# Patient Record
Sex: FEMALE | Race: WHITE | Employment: UNEMPLOYED | URBAN - METROPOLITAN AREA
[De-identification: names, ages, dates, MRNs, and addresses within clinical notes are randomized per-mention and may not be internally consistent; named-entity substitution may affect disease eponyms.]

---

## 2019-11-26 ENCOUNTER — TELEPHONE (OUTPATIENT)
Dept: SURGERY | Age: 54
End: 2019-11-26

## 2019-11-26 NOTE — TELEPHONE ENCOUNTER
Returned patient's call. She had implants placed around 20 years ago. Had breast cancer 10 years ago, and Dr. Steph Prakash did her lumpectomy. Due to multiple call backs in the past few years, she is interested in discussing prophylactic mastectomies and replacement of her implants. I told her that I recommended that she get an appointment with Dr. Steph Prakash to discuss the above. Her plastic surgeon was Dr. Madison Kidd so we will have to find another plastic surgeon for her as well, and she knows this. I transferred her call to a PSR to schedule the appointment. She was appreciative.

## 2019-12-13 ENCOUNTER — OFFICE VISIT (OUTPATIENT)
Dept: SURGERY | Age: 54
End: 2019-12-13

## 2019-12-13 VITALS
HEART RATE: 84 BPM | WEIGHT: 108 LBS | HEIGHT: 63 IN | BODY MASS INDEX: 19.14 KG/M2 | SYSTOLIC BLOOD PRESSURE: 117 MMHG | DIASTOLIC BLOOD PRESSURE: 69 MMHG

## 2019-12-13 DIAGNOSIS — Z98.82 PRESENCE OF BILATERAL BREAST IMPLANT: ICD-10-CM

## 2019-12-13 DIAGNOSIS — Z85.3 HISTORY OF RIGHT BREAST CANCER: Primary | ICD-10-CM

## 2019-12-13 RX ORDER — MINERAL OIL
ENEMA (ML) RECTAL DAILY
COMMUNITY

## 2019-12-13 RX ORDER — TRAZODONE HYDROCHLORIDE 50 MG/1
TABLET ORAL
COMMUNITY

## 2019-12-13 NOTE — PROGRESS NOTES
HISTORY OF PRESENT ILLNESS  Ubaldo Francis is a 62 y.o. female. HPI  NEW patient consult self-referred, previous patient of Dr. Ree Pang, here for consult to discuss surgery.  She had implants placed around 19 years ago. [facility] breast cancer 10 years ago, and Dr. Ree Pang did her lumpectomy. She had XRT and Tamoxifen for 5 years.  Due to multiple call backs for imaging in the past few years, she is interested in discussing prophylactic mastectomies and replacement of her implants.        02/10/09: RIGHT breast bx. PATH: IDC, 3.5mm, [place] combined histologic grade 1, ER+(>90%)/GA+(>90%), HER2- by FISH. Treated with lumpectomy, XRT, and Tamoxifen x5 years.     Family History:  No family history of breast or ovarian cancer.      Imaging at [facility]:  Patient brought disc, no reports. Will have to call [facility] next week for reports of recent mammogram and MRI.       Past Medical History:   Diagnosis Date    Cancer [facility]) 2009    RIGHT breast cancer    Lichen planus        Past Surgical History:   Procedure Laterality Date    BREAST SURGERY PROCEDURE UNLISTED  2009    lumpectomy       Social History     Socioeconomic History    Marital status:      Spouse name: Not on file    Number of children: Not on file    Years of education: Not on file    Highest education level: Not on file   Occupational History    Not on file   Social Needs    Financial resource strain: Not on file    Food insecurity:     Worry: Not on file     Inability: Not on file    Transportation needs:     Medical: Not on file     Non-medical: Not on file   Tobacco Use    Smoking status: Never Smoker    Smokeless tobacco: Never Used   Substance and Sexual Activity    Alcohol use: Yes     Comment: couple glasses of wine/mo    Drug use: Not on file    Sexual activity: Not on file   Lifestyle    Physical activity:     Days per week: Not on file     Minutes per session: Not on file    Stress: Not on file   Relationships    Social connections:     Talks on phone: Not on file     Gets together: Not on file     Attends Jain service: Not on file     Active member of club or organization: Not on file     Attends meetings of clubs or organizations: Not on file     Relationship status: Not on file    Intimate partner violence:     Fear of current or ex partner: Not on file     Emotionally abused: Not on file     Physically abused: Not on file     Forced sexual activity: Not on file   Other Topics Concern    Not on file   Social History Narrative    Not on file       Current Outpatient Medications on File Prior to Visit   Medication Sig Dispense Refill    fexofenadine (ALLEGRA) 180 mg tablet Take  by mouth daily.  traZODone (DESYREL) 50 mg tablet Take  by mouth nightly. No current facility-administered medications on file prior to visit. Allergies   Allergen Reactions    Pcn [Penicillins] Rash and Swelling       OB History    No obstetric history on file. Obstetric Comments   Menarche 13, LMP age 46, # of children 1, age of 4st delivery 27, Hysterectomy/oophorectomy no/no, Breast bx yes, BILAT 2009, history of breast feeding no, BCP yes, Hormone therapy no             Review of Systems   Constitutional: Negative. HENT: Negative. Eyes: Negative. Respiratory: Negative. Cardiovascular: Negative. Gastrointestinal: Negative. Genitourinary: Negative. Musculoskeletal: Negative. Skin: Negative. Neurological: Negative. Endo/Heme/Allergies: Negative. Psychiatric/Behavioral: Negative. Physical Exam  Exam conducted with a chaperone present. Cardiovascular:      Rate and Rhythm: Normal rate and regular rhythm. Heart sounds: Normal heart sounds. Pulmonary:      Breath sounds: Normal breath sounds. Chest:      Breasts: Breasts are symmetrical.         Right: Normal. No swelling, bleeding, inverted nipple, mass, nipple discharge, skin change or tenderness.          Left: Normal. No swelling, bleeding, inverted nipple, mass, nipple discharge, skin change or tenderness. Lymphadenopathy:      Cervical:      Right cervical: No superficial, deep or posterior cervical adenopathy. Left cervical: No superficial, deep or posterior cervical adenopathy. Upper Body:      Right upper body: No supraclavicular or axillary adenopathy. Left upper body: No supraclavicular or axillary adenopathy. ASSESSMENT and PLAN    ICD-10-CM ICD-9-CM    1. History of right breast cancer Z85.3 V10.3    2. Presence of bilateral breast implant Z98.82 V43.82       New patient with hx of RIGHT breast cancer presents to discuss surgery, and is doing well overall. Normal exam, with normal rippling at underside of saline implants bilaterally. No evidence of recurrence of RIGHT breast cancer. A total of  80 minutes were spent face-to-face with the patient during this encounter and over half of that time was spent on counseling and coordination of care. Discussed pt's motivation to have BL prophylactic mastectomy. She states that because she will eventually need implants removed and/or replaced, she is interested in mastectomy to avoid future surgical biopsies and callbacks based on mammograms. Discussed approx. 22% risk of a second breast cancer, and risk reduction with BL mastectomy. Discussed skin and nipple sparing mastectomy (with incision at inframammary fold), with surgical delay and nipple bx, followed 2-3 weeks later by mastectomy and probable direct implant replacement by plastic surgeon. Discussed increased risk for surgical complication given history of XRT. Discussed treatment options in case of complications with nipple delay, including nitro paste, hyperbaric oxygen treatment, or delay of the mastectomy surgery. Discussed possible placement of new implants above the muscle, TBD by plastic surgeon. Drains would be placed after mastectomy, but not after nipple delay.     Reviewed that an implant replacement with plastic surgeon, and without mastectomy, would be a much simpler outpatient procedure, with shorter recovery time and less risk of complications. Implants feel normal on exam; advised pt to contact a plastic surgeon in the event that she has a specific problem with her implants but would not just replace then if she is having no issues. Recommend that pt continue annual 3D mammography, but she does not need annual screening breast MRI. She will consider her options while continuing annual screening mammography. Also addressed pt's questions about risk for her daughter. F/U for exam and further discussion in October after next annual screening mammogram. This plan was reviewed with the patient and patient agrees. All questions were answered.     Written by Alan Swift, as dictated by Dr. Diya Iglesias MD.

## 2019-12-13 NOTE — PROGRESS NOTES
HISTORY OF PRESENT ILLNESS Amanda Martinez is a 62 y.o. female. HPI NEW patient consult self-referred, previous patient of Dr. Rafal Gracia, here for consult to discuss surgery. She had implants placed around 19 years ago. Had breast cancer 10 years ago, and Dr. Rafal Gracia did her lumpectomy. She had XRT and Tamoxifen for 5 years. Due to multiple call backs for imaging in the past few years, she is interested in discussing prophylactic mastectomies and replacement of her implants. 02/10/09: RIGHT breast bx. PATH: IDC, 3.5mm, [place] combined histologic grade 1, ER+(>90%)/NH+(>90%), HER2- by FISH. Family History: No family history of breast or ovarian cancer. Imaging at [facility]: 
Patient brought disc, no reports. Will have to call [facility] next week for reports of recent mammogram and MRI. Review of Systems All other systems reviewed and are negative. Physical Exam 
 
ASSESSMENT and PLAN 
{ASSESSMENT/PLAN:95716}

## 2019-12-13 NOTE — LETTER
December 13, 2019 Festus Powell [place] [address] [id number] Dear Junealfredito Andre: Thank you for requesting access to [employer]. Please follow the instructions below to view your test results, access and download parts of your medical record, view details of your past and upcoming appointments, and view your medications online. How Do I Sign Up? 1. In your internet browser, go to [email or web address] 2. Click on the First Time User? Click Here link in the Sign In box. You will see the New Member Sign Up page. 3. Enter your [employer] Access Code exactly as it appears below. You will not need to use this code after youve completed the sign-up process. If you do not sign up before the expiration date, you must request a new code. · [employer] Access Code: [email or web address] 
· Expires: 1/27/2020  1:42 PM 
 
4. Enter the last four digits of your Social Security Number (xxxx) and Date of Birth (mm/dd/yyyy) as indicated and click Submit. You will be taken to the next sign-up page. 5. Create a [employer] ID. This will be your [employer] login ID and cannot be changed, so think of one that is secure and easy to remember. 6. Create a [employer] password. You can change your password at any time. 7. Enter your Password Reset Question and Answer. This can be used at a later time if you forget your password. 8. Enter your e-mail address. You will receive e-mail notification when new information is available in [address]. 9. Click Sign Up. You can now view and download portions of your medical record. 10. Click the Download Summary menu link to download a portable copy of your medical information. Additional Information: If you require any assistance or have any questions, please contact our [address] at [phone], email at [email or web address]. Remember, [employer] is NOT to be used for urgent needs.  For medical emergencies, dial 911. Now available from your iPhone and Android! Sincerely, Nicole Davidson

## 2019-12-13 NOTE — LETTER
12/16/2019 2:33 PM 
 
Patient:  Dione Tompkins YOB: 1965 Date of Visit: 12/13/2019 Dear Dr. Marissa Berg: Thank you for referring Ms. Arlene Retana to me for evaluation/treatment. Below are the relevant portions of my assessment and plan of care. HISTORY OF PRESENT ILLNESS Dione Tompkins is a 62 y.o. female. HPI 
NEW patient consult self-referred, previous patient of Dr. Dayana Rivers, here for consult to discuss surgery.  She had implants placed around 19 years ago. [facility] breast cancer 10 years ago, and Dr. Dayana Rivers did her lumpectomy. She had XRT and Tamoxifen for 5 years.  Due to multiple call backs for imaging in the past few years, she is interested in discussing prophylactic mastectomies and replacement of her implants.   
   
02/10/09: RIGHT breast bx. PATH: IDC, 3.5mm, [place] combined histologic grade 1, ER+(>90%)/NM+(>90%), HER2- by FISH. Treated with lumpectomy, XRT, and Tamoxifen x5 years. 
  
Family History: No family history of breast or ovarian cancer. 
   
Imaging at [facility]: 
Patient brought disc, no reports. Will have to call [facility] next week for reports of recent mammogram and MRI. Past Medical History:  
Diagnosis Date  Cancer [facility]) 2009 RIGHT breast cancer  Lichen planus Past Surgical History:  
Procedure Laterality Date  BREAST SURGERY PROCEDURE UNLISTED  2009  
 lumpectomy Social History Socioeconomic History  Marital status:  Spouse name: Not on file  Number of children: Not on file  Years of education: Not on file  Highest education level: Not on file Occupational History  Not on file Social Needs  Financial resource strain: Not on file  Food insecurity:  
  Worry: Not on file Inability: Not on file  Transportation needs:  
  Medical: Not on file Non-medical: Not on file Tobacco Use  Smoking status: Never Smoker  Smokeless tobacco: Never Used Substance and Sexual Activity  Alcohol use: Yes Comment: couple glasses of wine/mo  Drug use: Not on file  Sexual activity: Not on file Lifestyle  Physical activity:  
  Days per week: Not on file Minutes per session: Not on file  Stress: Not on file Relationships  Social connections:  
  Talks on phone: Not on file Gets together: Not on file Attends Roman Catholic service: Not on file Active member of club or organization: Not on file Attends meetings of clubs or organizations: Not on file Relationship status: Not on file  Intimate partner violence:  
  Fear of current or ex partner: Not on file Emotionally abused: Not on file Physically abused: Not on file Forced sexual activity: Not on file Other Topics Concern  Not on file Social History Narrative  Not on file Current Outpatient Medications on File Prior to Visit Medication Sig Dispense Refill  fexofenadine (ALLEGRA) 180 mg tablet Take  by mouth daily.  traZODone (DESYREL) 50 mg tablet Take  by mouth nightly. No current facility-administered medications on file prior to visit. Allergies Allergen Reactions  Pcn [Penicillins] Rash and Swelling OB History No obstetric history on file. Obstetric Comments Menarche 13, LMP age 46, # of children 1, age of 4st delivery 27, Hysterectomy/oophorectomy no/no, Breast bx yes, BILAT 2009, history of breast feeding no, BCP yes, Hormone therapy no Review of Systems Constitutional: Negative. HENT: Negative. Eyes: Negative. Respiratory: Negative. Cardiovascular: Negative. Gastrointestinal: Negative. Genitourinary: Negative. Musculoskeletal: Negative. Skin: Negative. Neurological: Negative. Endo/Heme/Allergies: Negative. Psychiatric/Behavioral: Negative. Physical Exam 
Exam conducted with a chaperone present. Cardiovascular: Rate and Rhythm: Normal rate and regular rhythm. Heart sounds: Normal heart sounds. Pulmonary:  
   Breath sounds: Normal breath sounds. Chest:  
   Breasts: Breasts are symmetrical.  
      Right: Normal. No swelling, bleeding, inverted nipple, mass, nipple discharge, skin change or tenderness. Left: Normal. No swelling, bleeding, inverted nipple, mass, nipple discharge, skin change or tenderness. Lymphadenopathy:  
   Cervical:  
   Right cervical: No superficial, deep or posterior cervical adenopathy. Left cervical: No superficial, deep or posterior cervical adenopathy. Upper Body:  
   Right upper body: No supraclavicular or axillary adenopathy. Left upper body: No supraclavicular or axillary adenopathy. ASSESSMENT and PLAN 
  ICD-10-CM ICD-9-CM 1. History of right breast cancer Z85.3 V10.3 2. Presence of bilateral breast implant Z98.82 V43.82 New patient with hx of RIGHT breast cancer presents to discuss surgery, and is doing well overall. Normal exam, with normal rippling at underside of saline implants bilaterally. No evidence of recurrence of RIGHT breast cancer. A total of  80 minutes were spent face-to-face with the patient during this encounter and over half of that time was spent on counseling and coordination of care. Discussed pt's motivation to have BL prophylactic mastectomy. She states that because she will eventually need implants removed and/or replaced, she is interested in mastectomy to avoid future surgical biopsies and callbacks based on mammograms. Discussed approx. 22% risk of a second breast cancer, and risk reduction with BL mastectomy. Discussed skin and nipple sparing mastectomy (with incision at inframammary fold), with surgical delay and nipple bx, followed 2-3 weeks later by mastectomy and probable direct implant replacement by plastic surgeon.  Discussed increased risk for surgical complication given history of XRT. Discussed treatment options in case of complications with nipple delay, including nitro paste, hyperbaric oxygen treatment, or delay of the mastectomy surgery. Discussed possible placement of new implants above the muscle, TBD by plastic surgeon. Drains would be placed after mastectomy, but not after nipple delay. Reviewed that an implant replacement with plastic surgeon, and without mastectomy, would be a much simpler outpatient procedure, with shorter recovery time and less risk of complications. Implants feel normal on exam; advised pt to contact a plastic surgeon in the event that she has a specific problem with her implants but would not just replace then if she is having no issues. Recommend that pt continue annual 3D mammography, but she does not need annual screening breast MRI. She will consider her options while continuing annual screening mammography. Also addressed pt's questions about risk for her daughter. F/U for exam and further discussion in October after next annual screening mammogram. This plan was reviewed with the patient and patient agrees. All questions were answered. Written by Brisa Kasper, as dictated by Dr. Connor Brink MD. 
 
 
If you have questions, please do not hesitate to call me. I look forward to following Ms. Herrera along with you.  
 
 
 
Sincerely, 
 
 
Edvin Berkowitz., MD

## 2019-12-16 NOTE — COMMUNICATION BODY
HISTORY OF PRESENT ILLNESS  Ana Cousin is a 62 y.o. female. HPI  NEW patient consult self-referred, previous patient of Dr. Susan Bowling, here for consult to discuss surgery.  She had implants placed around 19 years ago. [facility] breast cancer 10 years ago, and Dr. Susan Bowling did her lumpectomy. She had XRT and Tamoxifen for 5 years.  Due to multiple call backs for imaging in the past few years, she is interested in discussing prophylactic mastectomies and replacement of her implants.        02/10/09: RIGHT breast bx. PATH: IDC, 3.5mm, [place] combined histologic grade 1, ER+(>90%)/TN+(>90%), HER2- by FISH. Treated with lumpectomy, XRT, and Tamoxifen x5 years.     Family History:  No family history of breast or ovarian cancer.      Imaging at [facility]:  Patient brought disc, no reports. Will have to call [facility] next week for reports of recent mammogram and MRI.       Past Medical History:   Diagnosis Date    Cancer [facility]) 2009    RIGHT breast cancer    Lichen planus        Past Surgical History:   Procedure Laterality Date    BREAST SURGERY PROCEDURE UNLISTED  2009    lumpectomy       Social History     Socioeconomic History    Marital status:      Spouse name: Not on file    Number of children: Not on file    Years of education: Not on file    Highest education level: Not on file   Occupational History    Not on file   Social Needs    Financial resource strain: Not on file    Food insecurity:     Worry: Not on file     Inability: Not on file    Transportation needs:     Medical: Not on file     Non-medical: Not on file   Tobacco Use    Smoking status: Never Smoker    Smokeless tobacco: Never Used   Substance and Sexual Activity    Alcohol use: Yes     Comment: couple glasses of wine/mo    Drug use: Not on file    Sexual activity: Not on file   Lifestyle    Physical activity:     Days per week: Not on file     Minutes per session: Not on file    Stress: Not on file   Relationships    Social connections:     Talks on phone: Not on file     Gets together: Not on file     Attends Yazidi service: Not on file     Active member of club or organization: Not on file     Attends meetings of clubs or organizations: Not on file     Relationship status: Not on file    Intimate partner violence:     Fear of current or ex partner: Not on file     Emotionally abused: Not on file     Physically abused: Not on file     Forced sexual activity: Not on file   Other Topics Concern    Not on file   Social History Narrative    Not on file       Current Outpatient Medications on File Prior to Visit   Medication Sig Dispense Refill    fexofenadine (ALLEGRA) 180 mg tablet Take  by mouth daily.  traZODone (DESYREL) 50 mg tablet Take  by mouth nightly. No current facility-administered medications on file prior to visit. Allergies   Allergen Reactions    Pcn [Penicillins] Rash and Swelling       OB History    No obstetric history on file. Obstetric Comments   Menarche 13, LMP age 46, # of children 1, age of 4st delivery 27, Hysterectomy/oophorectomy no/no, Breast bx yes, BILAT 2009, history of breast feeding no, BCP yes, Hormone therapy no             Review of Systems   Constitutional: Negative. HENT: Negative. Eyes: Negative. Respiratory: Negative. Cardiovascular: Negative. Gastrointestinal: Negative. Genitourinary: Negative. Musculoskeletal: Negative. Skin: Negative. Neurological: Negative. Endo/Heme/Allergies: Negative. Psychiatric/Behavioral: Negative. Physical Exam  Exam conducted with a chaperone present. Cardiovascular:      Rate and Rhythm: Normal rate and regular rhythm. Heart sounds: Normal heart sounds. Pulmonary:      Breath sounds: Normal breath sounds. Chest:      Breasts: Breasts are symmetrical.         Right: Normal. No swelling, bleeding, inverted nipple, mass, nipple discharge, skin change or tenderness.          Left: Normal. No swelling, bleeding, inverted nipple, mass, nipple discharge, skin change or tenderness. Lymphadenopathy:      Cervical:      Right cervical: No superficial, deep or posterior cervical adenopathy. Left cervical: No superficial, deep or posterior cervical adenopathy. Upper Body:      Right upper body: No supraclavicular or axillary adenopathy. Left upper body: No supraclavicular or axillary adenopathy. ASSESSMENT and PLAN    ICD-10-CM ICD-9-CM    1. History of right breast cancer Z85.3 V10.3    2. Presence of bilateral breast implant Z98.82 V43.82       New patient with hx of RIGHT breast cancer presents to discuss surgery, and is doing well overall. Normal exam, with normal rippling at underside of saline implants bilaterally. No evidence of recurrence of RIGHT breast cancer. A total of  80 minutes were spent face-to-face with the patient during this encounter and over half of that time was spent on counseling and coordination of care. Discussed pt's motivation to have BL prophylactic mastectomy. She states that because she will eventually need implants removed and/or replaced, she is interested in mastectomy to avoid future surgical biopsies and callbacks based on mammograms. Discussed approx. 22% risk of a second breast cancer, and risk reduction with BL mastectomy. Discussed skin and nipple sparing mastectomy (with incision at inframammary fold), with surgical delay and nipple bx, followed 2-3 weeks later by mastectomy and probable direct implant replacement by plastic surgeon. Discussed increased risk for surgical complication given history of XRT. Discussed treatment options in case of complications with nipple delay, including nitro paste, hyperbaric oxygen treatment, or delay of the mastectomy surgery. Discussed possible placement of new implants above the muscle, TBD by plastic surgeon. Drains would be placed after mastectomy, but not after nipple delay.     Reviewed that an implant replacement with plastic surgeon, and without mastectomy, would be a much simpler outpatient procedure, with shorter recovery time and less risk of complications. Implants feel normal on exam; advised pt to contact a plastic surgeon in the event that she has a specific problem with her implants but would not just replace then if she is having no issues. Recommend that pt continue annual 3D mammography, but she does not need annual screening breast MRI. She will consider her options while continuing annual screening mammography. Also addressed pt's questions about risk for her daughter. F/U for exam and further discussion in October after next annual screening mammogram. This plan was reviewed with the patient and patient agrees. All questions were answered.     Written by Brisa Kasper, as dictated by Dr. Connor Brink MD.

## 2019-12-27 ENCOUNTER — DOCUMENTATION ONLY (OUTPATIENT)
Dept: SURGERY | Age: 54
End: 2019-12-27

## 2020-05-13 ENCOUNTER — DOCUMENTATION ONLY (OUTPATIENT)
Dept: SURGERY | Age: 55
End: 2020-05-13

## 2020-05-13 NOTE — PROGRESS NOTES
Placed mammogram disc from [facility] in shred box to be destroyed. Unable to store copies of mammogram films due to HIPAA.

## 2022-07-22 ENCOUNTER — TRANSCRIBE ORDER (OUTPATIENT)
Dept: SCHEDULING | Age: 57
End: 2022-07-22

## 2022-07-22 DIAGNOSIS — M54.2 NECK PAIN: ICD-10-CM

## 2022-07-22 DIAGNOSIS — M50.322 DEGENERATION OF C5-C6 INTERVERTEBRAL DISC: ICD-10-CM

## 2022-07-22 DIAGNOSIS — M50.33 DEGENERATION OF CERVICOTHORACIC INTERVERTEBRAL DISC: ICD-10-CM

## 2022-07-22 DIAGNOSIS — M99.01 SOMATIC DYSFUNCTION OF CERVICAL REGION: Primary | ICD-10-CM

## 2022-07-22 DIAGNOSIS — M99.02 SOMATIC DYSFUNCTION OF THORACIC REGION: ICD-10-CM

## 2022-07-22 DIAGNOSIS — M47.22 CERVICAL SPONDYLOSIS WITH RADICULOPATHY: ICD-10-CM

## 2022-08-01 ENCOUNTER — HOSPITAL ENCOUNTER (OUTPATIENT)
Dept: MRI IMAGING | Age: 57
Discharge: HOME | End: 2022-08-01
Attending: GENERAL PRACTICE
Payer: COMMERCIAL

## 2022-08-01 DIAGNOSIS — M50.322 DEGENERATION OF C5-C6 INTERVERTEBRAL DISC: ICD-10-CM

## 2022-08-01 DIAGNOSIS — M47.22 CERVICAL SPONDYLOSIS WITH RADICULOPATHY: ICD-10-CM

## 2022-08-01 DIAGNOSIS — M99.01 SOMATIC DYSFUNCTION OF CERVICAL REGION: ICD-10-CM

## 2022-08-01 DIAGNOSIS — M54.2 NECK PAIN: ICD-10-CM

## 2022-08-01 DIAGNOSIS — M99.02 SOMATIC DYSFUNCTION OF THORACIC REGION: ICD-10-CM

## 2022-08-01 DIAGNOSIS — M50.33 DEGENERATION OF CERVICOTHORACIC INTERVERTEBRAL DISC: ICD-10-CM

## 2022-08-01 PROCEDURE — 72141 MRI NECK SPINE W/O DYE: CPT

## 2023-10-09 ENCOUNTER — TRANSCRIBE ORDERS (OUTPATIENT)
Facility: HOSPITAL | Age: 58
End: 2023-10-09

## 2023-10-09 DIAGNOSIS — Z12.31 ENCOUNTER FOR SCREENING MAMMOGRAM FOR BREAST CANCER: Primary | ICD-10-CM

## 2023-11-10 ENCOUNTER — HOSPITAL ENCOUNTER (OUTPATIENT)
Facility: HOSPITAL | Age: 58
Discharge: HOME | End: 2023-11-10
Attending: SURGERY
Payer: COMMERCIAL

## 2023-11-10 DIAGNOSIS — Z12.31 ENCOUNTER FOR SCREENING MAMMOGRAM FOR BREAST CANCER: ICD-10-CM

## 2023-11-10 PROCEDURE — 77063 BREAST TOMOSYNTHESIS BI: CPT

## 2024-07-31 ENCOUNTER — TRANSCRIBE ORDERS (OUTPATIENT)
Facility: HOSPITAL | Age: 59
End: 2024-07-31

## 2024-07-31 ENCOUNTER — HOSPITAL ENCOUNTER (OUTPATIENT)
Facility: HOSPITAL | Age: 59
Discharge: HOME | End: 2024-08-03
Attending: INTERNAL MEDICINE
Payer: COMMERCIAL

## 2024-07-31 DIAGNOSIS — R05.9 COUGH, UNSPECIFIED TYPE: Primary | ICD-10-CM

## 2024-07-31 DIAGNOSIS — R05.9 COUGH, UNSPECIFIED TYPE: ICD-10-CM

## 2024-07-31 PROCEDURE — 71046 X-RAY EXAM CHEST 2 VIEWS: CPT

## 2024-11-11 ENCOUNTER — HOSPITAL ENCOUNTER (OUTPATIENT)
Facility: HOSPITAL | Age: 59
Discharge: HOME | End: 2024-11-14
Attending: SURGERY
Payer: COMMERCIAL

## 2024-11-11 DIAGNOSIS — Z12.31 ENCOUNTER FOR SCREENING MAMMOGRAM FOR MALIGNANT NEOPLASM OF BREAST: ICD-10-CM

## 2024-11-11 PROCEDURE — 77063 BREAST TOMOSYNTHESIS BI: CPT

## 2025-06-09 ENCOUNTER — HOSPITAL ENCOUNTER (EMERGENCY)
Facility: HOSPITAL | Age: 60
Discharge: HOME | End: 2025-06-09
Attending: EMERGENCY MEDICINE
Payer: COMMERCIAL

## 2025-06-09 ENCOUNTER — APPOINTMENT (OUTPATIENT)
Facility: HOSPITAL | Age: 60
End: 2025-06-09
Payer: COMMERCIAL

## 2025-06-09 VITALS
RESPIRATION RATE: 17 BRPM | DIASTOLIC BLOOD PRESSURE: 71 MMHG | SYSTOLIC BLOOD PRESSURE: 132 MMHG | OXYGEN SATURATION: 99 % | HEIGHT: 63 IN | TEMPERATURE: 97.1 F | BODY MASS INDEX: 19.65 KG/M2 | WEIGHT: 110.89 LBS | HEART RATE: 71 BPM

## 2025-06-09 DIAGNOSIS — R07.89 CHEST WALL PAIN: Primary | ICD-10-CM

## 2025-06-09 DIAGNOSIS — J18.9 PNEUMONIA OF RIGHT UPPER LOBE DUE TO INFECTIOUS ORGANISM: ICD-10-CM

## 2025-06-09 LAB
ALBUMIN SERPL-MCNC: 3.8 G/DL (ref 3.5–5)
ALBUMIN/GLOB SERPL: 1.2 (ref 1.1–2.2)
ALP SERPL-CCNC: 52 U/L (ref 45–117)
ALT SERPL-CCNC: 25 U/L (ref 12–78)
ANION GAP SERPL CALC-SCNC: 8 MMOL/L (ref 2–12)
AST SERPL-CCNC: 28 U/L (ref 15–37)
BASOPHILS # BLD: 0.05 K/UL (ref 0–0.1)
BASOPHILS NFR BLD: 0.8 % (ref 0–1)
BILIRUB SERPL-MCNC: 0.5 MG/DL (ref 0.2–1)
BUN SERPL-MCNC: 11 MG/DL (ref 6–20)
BUN/CREAT SERPL: 12 (ref 12–20)
CALCIUM SERPL-MCNC: 8.7 MG/DL (ref 8.5–10.1)
CHLORIDE SERPL-SCNC: 104 MMOL/L (ref 97–108)
CO2 SERPL-SCNC: 28 MMOL/L (ref 21–32)
COMMENT:: NORMAL
CREAT SERPL-MCNC: 0.95 MG/DL (ref 0.55–1.02)
D DIMER PPP FEU-MCNC: 0.83 MG/L FEU (ref 0–0.65)
DIFFERENTIAL METHOD BLD: NORMAL
EOSINOPHIL # BLD: 0.24 K/UL (ref 0–0.4)
EOSINOPHIL NFR BLD: 3.6 % (ref 0–7)
ERYTHROCYTE [DISTWIDTH] IN BLOOD BY AUTOMATED COUNT: 11.9 % (ref 11.5–14.5)
GLOBULIN SER CALC-MCNC: 3.1 G/DL (ref 2–4)
GLUCOSE SERPL-MCNC: 97 MG/DL (ref 65–100)
HCT VFR BLD AUTO: 36.2 % (ref 35–47)
HGB BLD-MCNC: 12.3 G/DL (ref 11.5–16)
IMM GRANULOCYTES # BLD AUTO: 0.01 K/UL (ref 0–0.04)
IMM GRANULOCYTES NFR BLD AUTO: 0.2 % (ref 0–0.5)
LYMPHOCYTES # BLD: 2.47 K/UL (ref 0.8–3.5)
LYMPHOCYTES NFR BLD: 37.3 % (ref 12–49)
MCH RBC QN AUTO: 29.7 PG (ref 26–34)
MCHC RBC AUTO-ENTMCNC: 34 G/DL (ref 30–36.5)
MCV RBC AUTO: 87.4 FL (ref 80–99)
MONOCYTES # BLD: 0.41 K/UL (ref 0–1)
MONOCYTES NFR BLD: 6.2 % (ref 5–13)
NEUTS SEG # BLD: 3.44 K/UL (ref 1.8–8)
NEUTS SEG NFR BLD: 51.9 % (ref 32–75)
NRBC # BLD: 0 K/UL (ref 0–0.01)
NRBC BLD-RTO: 0 PER 100 WBC
PLATELET # BLD AUTO: 266 K/UL (ref 150–400)
PMV BLD AUTO: 9.2 FL (ref 8.9–12.9)
POTASSIUM SERPL-SCNC: 4 MMOL/L (ref 3.5–5.1)
PROT SERPL-MCNC: 6.9 G/DL (ref 6.4–8.2)
RBC # BLD AUTO: 4.14 M/UL (ref 3.8–5.2)
SODIUM SERPL-SCNC: 140 MMOL/L (ref 136–145)
SPECIMEN HOLD: NORMAL
TROPONIN I SERPL HS-MCNC: 4 NG/L (ref 0–51)
TROPONIN I SERPL HS-MCNC: 4 NG/L (ref 0–51)
WBC # BLD AUTO: 6.6 K/UL (ref 3.6–11)

## 2025-06-09 PROCEDURE — 84484 ASSAY OF TROPONIN QUANT: CPT

## 2025-06-09 PROCEDURE — 71045 X-RAY EXAM CHEST 1 VIEW: CPT

## 2025-06-09 PROCEDURE — 2580000003 HC RX 258: Performed by: EMERGENCY MEDICINE

## 2025-06-09 PROCEDURE — 85025 COMPLETE CBC W/AUTO DIFF WBC: CPT

## 2025-06-09 PROCEDURE — 36415 COLL VENOUS BLD VENIPUNCTURE: CPT

## 2025-06-09 PROCEDURE — 85379 FIBRIN DEGRADATION QUANT: CPT

## 2025-06-09 PROCEDURE — 99285 EMERGENCY DEPT VISIT HI MDM: CPT

## 2025-06-09 PROCEDURE — 6360000004 HC RX CONTRAST MEDICATION: Performed by: EMERGENCY MEDICINE

## 2025-06-09 PROCEDURE — 93005 ELECTROCARDIOGRAM TRACING: CPT | Performed by: EMERGENCY MEDICINE

## 2025-06-09 PROCEDURE — 71275 CT ANGIOGRAPHY CHEST: CPT

## 2025-06-09 PROCEDURE — 80053 COMPREHEN METABOLIC PANEL: CPT

## 2025-06-09 PROCEDURE — 6370000000 HC RX 637 (ALT 250 FOR IP): Performed by: EMERGENCY MEDICINE

## 2025-06-09 RX ORDER — AZITHROMYCIN 250 MG/1
TABLET, FILM COATED ORAL
Qty: 1 PACKET | Refills: 0 | Status: SHIPPED | OUTPATIENT
Start: 2025-06-09 | End: 2025-06-10 | Stop reason: ALTCHOICE

## 2025-06-09 RX ORDER — MINOXIDIL 2.5 MG/1
2.5 TABLET ORAL DAILY
COMMUNITY

## 2025-06-09 RX ORDER — ASPIRIN 81 MG/1
324 TABLET, CHEWABLE ORAL
Status: COMPLETED | OUTPATIENT
Start: 2025-06-09 | End: 2025-06-09

## 2025-06-09 RX ORDER — 0.9 % SODIUM CHLORIDE 0.9 %
1000 INTRAVENOUS SOLUTION INTRAVENOUS ONCE
Status: COMPLETED | OUTPATIENT
Start: 2025-06-09 | End: 2025-06-09

## 2025-06-09 RX ORDER — IOPAMIDOL 755 MG/ML
100 INJECTION, SOLUTION INTRAVASCULAR
Status: COMPLETED | OUTPATIENT
Start: 2025-06-09 | End: 2025-06-09

## 2025-06-09 RX ORDER — AZITHROMYCIN 250 MG/1
500 TABLET, FILM COATED ORAL
Status: COMPLETED | OUTPATIENT
Start: 2025-06-09 | End: 2025-06-09

## 2025-06-09 RX ADMIN — AZITHROMYCIN DIHYDRATE 500 MG: 250 TABLET ORAL at 18:07

## 2025-06-09 RX ADMIN — IOPAMIDOL 60 ML: 755 INJECTION, SOLUTION INTRAVENOUS at 16:41

## 2025-06-09 RX ADMIN — SODIUM CHLORIDE 1000 ML: 0.9 INJECTION, SOLUTION INTRAVENOUS at 16:02

## 2025-06-09 RX ADMIN — ASPIRIN 81 MG CHEWABLE TABLET 324 MG: 81 TABLET CHEWABLE at 15:29

## 2025-06-09 ASSESSMENT — HEART SCORE: ECG: NON-SPECIFC REPOLARIZATION DISTURBANCE/LBTB/PM

## 2025-06-09 ASSESSMENT — PAIN SCALES - GENERAL: PAINLEVEL_OUTOF10: 0

## 2025-06-09 NOTE — ED TRIAGE NOTES
ED triage note: ambulatory with a steady gait accompanied. Patient reports around 1345 was shopping in the grocery store when started having tightness in entire chest and jaw pain lasting approximately 10 mins.

## 2025-06-09 NOTE — ED PROVIDER NOTES
LABS:  Labs Reviewed   D-DIMER, QUANTITATIVE - Abnormal; Notable for the following components:       Result Value    D-Dimer, Quant 0.83 (*)     All other components within normal limits   CBC WITH AUTO DIFFERENTIAL   COMPREHENSIVE METABOLIC PANEL   EXTRA TUBES HOLD   TROPONIN   TROPONIN       All other labs were within normal range or not returned as of this dictation.    EMERGENCY DEPARTMENT COURSE and DIFFERENTIAL DIAGNOSIS/MDM:   Vitals:    Vitals:    06/09/25 1452 06/09/25 1453 06/09/25 1530 06/09/25 1700   BP:  132/81 114/76 (!) 142/84   Pulse:  89 79 75   Resp:  16 20 18   Temp:  98.4 °F (36.9 °C)     SpO2:  98% 99% 100%   Weight: 50.3 kg (110 lb 14.3 oz)      Height: 1.6 m (5' 3\")              Medical Decision Making  59-year-old female presents emergency department chief complaint of sudden onset of chest discomfort with radiation up into her jaw.  She denies a prior history of this.  She had a history of breast cancer many years ago.  No significant family history for cardiac disease.  She is a non-smoker no alcohol and workup out regularly.  No prior history of cardiac workup.  She did state that she had some fast food earlier which occasionally gives her GI symptoms.  She had eaten at the same restaurant earlier and then had a sudden urge for bowel movement.  She states she is never had this this comfort in her chest however before.  Does not take daily aspirin.  States she has normally lower blood pressure.  Consider unstable angina versus GI upset versus PE versus other.  Will order troponin x 2, EKG x 2, give aspirin as well as check other lab work.  Patient is resting comfortably at this time    Amount and/or Complexity of Data Reviewed  Labs: ordered.  Radiology: ordered.  ECG/medicine tests: ordered.    Risk  OTC drugs.  Prescription drug management.            REASSESSMENT     ED Course as of 06/09/25 1803   Mon Jun 09, 2025   1453 EKG shows normal sinus rhythm, 87 bpm, no acute ischemic

## 2025-06-10 LAB
EKG ATRIAL RATE: 68 BPM
EKG ATRIAL RATE: 87 BPM
EKG DIAGNOSIS: NORMAL
EKG DIAGNOSIS: NORMAL
EKG P AXIS: 80 DEGREES
EKG P AXIS: 83 DEGREES
EKG P-R INTERVAL: 166 MS
EKG P-R INTERVAL: 174 MS
EKG Q-T INTERVAL: 346 MS
EKG Q-T INTERVAL: 400 MS
EKG QRS DURATION: 60 MS
EKG QRS DURATION: 72 MS
EKG QTC CALCULATION (BAZETT): 416 MS
EKG QTC CALCULATION (BAZETT): 425 MS
EKG R AXIS: 47 DEGREES
EKG R AXIS: 47 DEGREES
EKG T AXIS: 33 DEGREES
EKG T AXIS: 37 DEGREES
EKG VENTRICULAR RATE: 68 BPM
EKG VENTRICULAR RATE: 87 BPM

## 2025-06-10 PROCEDURE — 93010 ELECTROCARDIOGRAM REPORT: CPT | Performed by: INTERNAL MEDICINE

## 2025-06-10 RX ORDER — DOXYCYCLINE HYCLATE 100 MG
100 TABLET ORAL 2 TIMES DAILY
Qty: 14 TABLET | Refills: 0 | Status: SHIPPED | OUTPATIENT
Start: 2025-06-10 | End: 2025-06-17

## 2025-06-10 NOTE — ED NOTES
Patient called the department reporting severe diarrhea while on Azithromycin and requesting an alternative antibiotic.  Azithromycin was discontinued and Doxycycline was prescribed.  I updated the patient's allergies accordingly.     Shawna Abernathy MD  06/10/25 3825

## 2025-07-21 ENCOUNTER — TRANSCRIBE ORDERS (OUTPATIENT)
Facility: HOSPITAL | Age: 60
End: 2025-07-21

## 2025-07-21 DIAGNOSIS — Z12.31 VISIT FOR SCREENING MAMMOGRAM: Primary | ICD-10-CM
